# Patient Record
Sex: MALE | Race: WHITE | NOT HISPANIC OR LATINO | Employment: FULL TIME | URBAN - METROPOLITAN AREA
[De-identification: names, ages, dates, MRNs, and addresses within clinical notes are randomized per-mention and may not be internally consistent; named-entity substitution may affect disease eponyms.]

---

## 2023-03-17 DIAGNOSIS — J45.909 ASTHMA, UNSPECIFIED ASTHMA SEVERITY, UNSPECIFIED WHETHER COMPLICATED, UNSPECIFIED WHETHER PERSISTENT (HHS-HCC): ICD-10-CM

## 2023-03-17 RX ORDER — ALBUTEROL SULFATE 90 UG/1
2 AEROSOL, METERED RESPIRATORY (INHALATION) EVERY 4 HOURS PRN
COMMUNITY
Start: 2013-10-21 | End: 2023-03-17 | Stop reason: SDUPTHER

## 2023-03-17 RX ORDER — ALBUTEROL SULFATE 90 UG/1
2 AEROSOL, METERED RESPIRATORY (INHALATION) EVERY 4 HOURS PRN
Qty: 18 G | Refills: 3 | Status: SHIPPED | OUTPATIENT
Start: 2023-03-17 | End: 2023-11-10 | Stop reason: SDUPTHER

## 2023-07-31 PROBLEM — R23.2 FACIAL FLUSHING: Status: ACTIVE | Noted: 2023-07-31

## 2023-07-31 PROBLEM — H61.23 BILATERAL IMPACTED CERUMEN: Status: RESOLVED | Noted: 2023-07-31 | Resolved: 2023-07-31

## 2023-07-31 PROBLEM — K21.9 GERD (GASTROESOPHAGEAL REFLUX DISEASE): Status: ACTIVE | Noted: 2023-07-31

## 2023-07-31 PROBLEM — R11.2 NAUSEA AND VOMITING: Status: RESOLVED | Noted: 2023-07-31 | Resolved: 2023-07-31

## 2023-07-31 PROBLEM — F41.9 ANXIETY DISORDER: Status: ACTIVE | Noted: 2023-07-31

## 2023-07-31 PROBLEM — J30.9 ALLERGIC RHINITIS: Status: ACTIVE | Noted: 2023-07-31

## 2023-07-31 PROBLEM — J06.9 UPPER RESPIRATORY INFECTION: Status: RESOLVED | Noted: 2023-07-31 | Resolved: 2023-07-31

## 2023-07-31 PROBLEM — M75.82 TENDINITIS OF LEFT ROTATOR CUFF: Status: ACTIVE | Noted: 2023-07-31

## 2023-07-31 PROBLEM — A08.4 VIRAL GASTROENTERITIS: Status: RESOLVED | Noted: 2023-07-31 | Resolved: 2023-07-31

## 2023-07-31 PROBLEM — F43.20 ADJUSTMENT DISORDER: Status: ACTIVE | Noted: 2023-07-31

## 2023-07-31 PROBLEM — N45.2 ORCHITIS: Status: ACTIVE | Noted: 2023-07-31

## 2023-07-31 PROBLEM — R35.0 URINARY FREQUENCY: Status: RESOLVED | Noted: 2023-07-31 | Resolved: 2023-07-31

## 2023-07-31 PROBLEM — J45.909 ASTHMA (HHS-HCC): Status: ACTIVE | Noted: 2023-07-31

## 2023-08-01 ENCOUNTER — OFFICE VISIT (OUTPATIENT)
Dept: PRIMARY CARE | Facility: CLINIC | Age: 23
End: 2023-08-01
Payer: COMMERCIAL

## 2023-08-01 VITALS
HEART RATE: 86 BPM | DIASTOLIC BLOOD PRESSURE: 86 MMHG | BODY MASS INDEX: 36.07 KG/M2 | SYSTOLIC BLOOD PRESSURE: 132 MMHG | HEIGHT: 71 IN

## 2023-08-01 DIAGNOSIS — J01.00 ACUTE NON-RECURRENT MAXILLARY SINUSITIS: Primary | ICD-10-CM

## 2023-08-01 DIAGNOSIS — J30.9 ALLERGIC RHINITIS, UNSPECIFIED SEASONALITY, UNSPECIFIED TRIGGER: ICD-10-CM

## 2023-08-01 PROCEDURE — 1036F TOBACCO NON-USER: CPT | Performed by: FAMILY MEDICINE

## 2023-08-01 PROCEDURE — 99213 OFFICE O/P EST LOW 20 MIN: CPT | Performed by: FAMILY MEDICINE

## 2023-08-01 RX ORDER — FLUTICASONE PROPIONATE 50 MCG
2 SPRAY, SUSPENSION (ML) NASAL DAILY
Qty: 16 G | Refills: 1 | Status: SHIPPED | OUTPATIENT
Start: 2023-08-01 | End: 2023-12-15 | Stop reason: SDUPTHER

## 2023-08-01 RX ORDER — CETIRIZINE HYDROCHLORIDE 10 MG/1
10 TABLET ORAL DAILY
Qty: 30 TABLET | Refills: 2 | Status: SHIPPED | OUTPATIENT
Start: 2023-08-01 | End: 2023-12-15 | Stop reason: ALTCHOICE

## 2023-08-01 RX ORDER — LEVOFLOXACIN 500 MG/1
500 TABLET, FILM COATED ORAL DAILY
Qty: 7 TABLET | Refills: 0 | Status: SHIPPED | OUTPATIENT
Start: 2023-08-01 | End: 2023-08-08

## 2023-08-01 ASSESSMENT — PATIENT HEALTH QUESTIONNAIRE - PHQ9
1. LITTLE INTEREST OR PLEASURE IN DOING THINGS: NOT AT ALL
2. FEELING DOWN, DEPRESSED OR HOPELESS: NOT AT ALL
SUM OF ALL RESPONSES TO PHQ9 QUESTIONS 1 AND 2: 0

## 2023-08-04 ENCOUNTER — TELEPHONE (OUTPATIENT)
Dept: PRIMARY CARE | Facility: CLINIC | Age: 23
End: 2023-08-04
Payer: COMMERCIAL

## 2023-08-05 PROBLEM — J01.00 ACUTE NON-RECURRENT MAXILLARY SINUSITIS: Status: ACTIVE | Noted: 2023-08-05

## 2023-08-05 ASSESSMENT — ENCOUNTER SYMPTOMS: HYPERTENSION: 1

## 2023-08-05 NOTE — PROGRESS NOTES
Subjective   Patient ID: Markus Carballo is a 22 y.o. male who presents for Sinusitis, Hypertension, and Follow-up (Would like lead levels checked).  Sinusitis    Hypertension      Patient presents today with complaint of.  Otherwise feels well. No other complaints or concerns.  Acute sinusitis c/o headache fever facial pain cough ear pain cough green phlem. No ST .      The patient's relevant past medical, surgical, family and social history was reviewed in Epic.    Review of Systems  A complete review of systems was performed and all systems were normal except what is noted in the HPI.  Cough congestion postnasal drainage    Objective   Physical Exam  Constitutional:       Appearance: Normal appearance.   HENT:      Nose: Congestion present.      Mouth/Throat:      Pharynx: Posterior oropharyngeal erythema present.   Cardiovascular:      Rate and Rhythm: Normal rate and regular rhythm.   Pulmonary:      Effort: Pulmonary effort is normal.      Breath sounds: Normal breath sounds.   Musculoskeletal:         General: Normal range of motion.   Neurological:      General: No focal deficit present.      Mental Status: He is alert.             Assessment/Plan   Problem List Items Addressed This Visit       Allergic rhinitis    Acute non-recurrent maxillary sinusitis - Primary    Relevant Medications    levoFLOXacin (Levaquin) 500 mg tablet    fluticasone (Flonase Allergy Relief) 50 mcg/actuation nasal spray    cetirizine (ZyrTEC) 10 mg tablet        Patient understands and agrees with treatment plan.         Kedar Verma MD

## 2023-08-30 ENCOUNTER — CLINICAL SUPPORT (OUTPATIENT)
Dept: PRIMARY CARE | Facility: CLINIC | Age: 23
End: 2023-08-30
Payer: COMMERCIAL

## 2023-08-30 DIAGNOSIS — K21.00 GASTROESOPHAGEAL REFLUX DISEASE WITH ESOPHAGITIS, UNSPECIFIED WHETHER HEMORRHAGE: ICD-10-CM

## 2023-08-30 DIAGNOSIS — Z13.220 SCREENING FOR CHOLESTEROL LEVEL: ICD-10-CM

## 2023-08-30 DIAGNOSIS — Z13.29 SCREENING FOR THYROID DISORDER: ICD-10-CM

## 2023-08-30 LAB
ALANINE AMINOTRANSFERASE (SGPT) (U/L) IN SER/PLAS: 38 U/L (ref 10–52)
ALBUMIN (G/DL) IN SER/PLAS: 5.2 G/DL (ref 3.4–5)
ALKALINE PHOSPHATASE (U/L) IN SER/PLAS: 75 U/L (ref 33–120)
ANION GAP IN SER/PLAS: 15 MMOL/L (ref 10–20)
ASPARTATE AMINOTRANSFERASE (SGOT) (U/L) IN SER/PLAS: 24 U/L (ref 9–39)
BASOPHILS (10*3/UL) IN BLOOD BY AUTOMATED COUNT: 0.04 X10E9/L (ref 0–0.1)
BASOPHILS/100 LEUKOCYTES IN BLOOD BY AUTOMATED COUNT: 0.7 % (ref 0–2)
BILIRUBIN TOTAL (MG/DL) IN SER/PLAS: 0.6 MG/DL (ref 0–1.2)
CALCIUM (MG/DL) IN SER/PLAS: 10 MG/DL (ref 8.6–10.6)
CARBON DIOXIDE, TOTAL (MMOL/L) IN SER/PLAS: 28 MMOL/L (ref 21–32)
CHLORIDE (MMOL/L) IN SER/PLAS: 103 MMOL/L (ref 98–107)
CHOLESTEROL (MG/DL) IN SER/PLAS: 183 MG/DL (ref 0–199)
CHOLESTEROL IN HDL (MG/DL) IN SER/PLAS: 44.6 MG/DL
CHOLESTEROL/HDL RATIO: 4.1
CREATININE (MG/DL) IN SER/PLAS: 1.03 MG/DL (ref 0.5–1.3)
EOSINOPHILS (10*3/UL) IN BLOOD BY AUTOMATED COUNT: 0.12 X10E9/L (ref 0–0.7)
EOSINOPHILS/100 LEUKOCYTES IN BLOOD BY AUTOMATED COUNT: 2 % (ref 0–6)
ERYTHROCYTE DISTRIBUTION WIDTH (RATIO) BY AUTOMATED COUNT: 12.6 % (ref 11.5–14.5)
ERYTHROCYTE MEAN CORPUSCULAR HEMOGLOBIN CONCENTRATION (G/DL) BY AUTOMATED: 34.1 G/DL (ref 32–36)
ERYTHROCYTE MEAN CORPUSCULAR VOLUME (FL) BY AUTOMATED COUNT: 82 FL (ref 80–100)
ERYTHROCYTES (10*6/UL) IN BLOOD BY AUTOMATED COUNT: 5.47 X10E12/L (ref 4.5–5.9)
GFR MALE: >90 ML/MIN/1.73M2
GLUCOSE (MG/DL) IN SER/PLAS: 75 MG/DL (ref 74–99)
HEMATOCRIT (%) IN BLOOD BY AUTOMATED COUNT: 44.9 % (ref 41–52)
HEMOGLOBIN (G/DL) IN BLOOD: 15.3 G/DL (ref 13.5–17.5)
IMMATURE GRANULOCYTES/100 LEUKOCYTES IN BLOOD BY AUTOMATED COUNT: 0.3 % (ref 0–0.9)
LDL: 105 MG/DL (ref 0–119)
LEUKOCYTES (10*3/UL) IN BLOOD BY AUTOMATED COUNT: 6 X10E9/L (ref 4.4–11.3)
LYMPHOCYTES (10*3/UL) IN BLOOD BY AUTOMATED COUNT: 1.54 X10E9/L (ref 1.2–4.8)
LYMPHOCYTES/100 LEUKOCYTES IN BLOOD BY AUTOMATED COUNT: 25.6 % (ref 13–44)
MONOCYTES (10*3/UL) IN BLOOD BY AUTOMATED COUNT: 0.29 X10E9/L (ref 0.1–1)
MONOCYTES/100 LEUKOCYTES IN BLOOD BY AUTOMATED COUNT: 4.8 % (ref 2–10)
NEUTROPHILS (10*3/UL) IN BLOOD BY AUTOMATED COUNT: 4 X10E9/L (ref 1.2–7.7)
NEUTROPHILS/100 LEUKOCYTES IN BLOOD BY AUTOMATED COUNT: 66.6 % (ref 40–80)
NON HDL CHOLESTEROL: 138 MG/DL (ref 0–149)
NRBC (PER 100 WBCS) BY AUTOMATED COUNT: 0 /100 WBC (ref 0–0)
PLATELETS (10*3/UL) IN BLOOD AUTOMATED COUNT: 209 X10E9/L (ref 150–450)
POTASSIUM (MMOL/L) IN SER/PLAS: 3.8 MMOL/L (ref 3.5–5.3)
PROTEIN TOTAL: 7.2 G/DL (ref 6.4–8.2)
SODIUM (MMOL/L) IN SER/PLAS: 142 MMOL/L (ref 136–145)
THYROTROPIN (MIU/L) IN SER/PLAS BY DETECTION LIMIT <= 0.05 MIU/L: 1.27 MIU/L (ref 0.44–3.98)
TRIGLYCERIDE (MG/DL) IN SER/PLAS: 165 MG/DL (ref 0–149)
UREA NITROGEN (MG/DL) IN SER/PLAS: 12 MG/DL (ref 6–23)
VLDL: 33 MG/DL (ref 0–40)

## 2023-08-30 PROCEDURE — 80053 COMPREHEN METABOLIC PANEL: CPT

## 2023-08-30 PROCEDURE — 80061 LIPID PANEL: CPT

## 2023-08-30 PROCEDURE — 84443 ASSAY THYROID STIM HORMONE: CPT

## 2023-08-30 PROCEDURE — 85025 COMPLETE CBC W/AUTO DIFF WBC: CPT

## 2023-09-06 ENCOUNTER — TELEPHONE (OUTPATIENT)
Dept: PRIMARY CARE | Facility: CLINIC | Age: 23
End: 2023-09-06
Payer: COMMERCIAL

## 2023-11-10 DIAGNOSIS — J45.909 ASTHMA, UNSPECIFIED ASTHMA SEVERITY, UNSPECIFIED WHETHER COMPLICATED, UNSPECIFIED WHETHER PERSISTENT (HHS-HCC): ICD-10-CM

## 2023-11-10 RX ORDER — ALBUTEROL SULFATE 90 UG/1
2 AEROSOL, METERED RESPIRATORY (INHALATION) EVERY 4 HOURS PRN
Qty: 18 G | Refills: 1 | Status: SHIPPED | OUTPATIENT
Start: 2023-11-10 | End: 2024-01-11 | Stop reason: SDUPTHER

## 2023-12-15 ENCOUNTER — OFFICE VISIT (OUTPATIENT)
Dept: PRIMARY CARE | Facility: CLINIC | Age: 23
End: 2023-12-15
Payer: COMMERCIAL

## 2023-12-15 VITALS
HEIGHT: 72 IN | DIASTOLIC BLOOD PRESSURE: 88 MMHG | SYSTOLIC BLOOD PRESSURE: 144 MMHG | HEART RATE: 65 BPM | BODY MASS INDEX: 31.42 KG/M2 | WEIGHT: 232 LBS

## 2023-12-15 DIAGNOSIS — J01.00 ACUTE NON-RECURRENT MAXILLARY SINUSITIS: ICD-10-CM

## 2023-12-15 PROCEDURE — 1036F TOBACCO NON-USER: CPT | Performed by: FAMILY MEDICINE

## 2023-12-15 PROCEDURE — 99213 OFFICE O/P EST LOW 20 MIN: CPT | Performed by: FAMILY MEDICINE

## 2023-12-15 RX ORDER — FLUTICASONE PROPIONATE 50 MCG
2 SPRAY, SUSPENSION (ML) NASAL DAILY
Qty: 16 G | Refills: 1 | Status: SHIPPED | OUTPATIENT
Start: 2023-12-15 | End: 2024-12-14

## 2023-12-15 RX ORDER — AZITHROMYCIN 250 MG/1
TABLET, FILM COATED ORAL
Qty: 6 TABLET | Refills: 0 | Status: SHIPPED | OUTPATIENT
Start: 2023-12-15 | End: 2023-12-20

## 2023-12-15 ASSESSMENT — ENCOUNTER SYMPTOMS
DEPRESSION: 0
OCCASIONAL FEELINGS OF UNSTEADINESS: 0
LOSS OF SENSATION IN FEET: 0

## 2023-12-15 ASSESSMENT — PATIENT HEALTH QUESTIONNAIRE - PHQ9
2. FEELING DOWN, DEPRESSED OR HOPELESS: NOT AT ALL
1. LITTLE INTEREST OR PLEASURE IN DOING THINGS: NOT AT ALL
SUM OF ALL RESPONSES TO PHQ9 QUESTIONS 1 AND 2: 0

## 2023-12-15 NOTE — PROGRESS NOTES
Subjective   Patient ID: Markus Carballo is a 23 y.o. male who presents for Hoarseness (No voice) and Cough.      HPI  Acute sinusitis c/o headache fever facial pain cough ear pain cough green phlem. No ST .     Current Outpatient Medications on File Prior to Visit   Medication Sig Dispense Refill    albuterol 90 mcg/actuation inhaler Inhale 2 puffs every 4 hours if needed for wheezing or shortness of breath. 18 g 1    fluticasone (Flonase Allergy Relief) 50 mcg/actuation nasal spray Administer 2 sprays into each nostril once daily. Shake gently. Before first use, prime pump. After use, clean tip and replace cap. 16 g 1    [DISCONTINUED] cetirizine (ZyrTEC) 10 mg tablet Take 1 tablet (10 mg) by mouth once daily. 30 tablet 2     No current facility-administered medications on file prior to visit.        Review of Systems   Constitutional: Negative.    HENT:  Positive for congestion, ear pain, postnasal drip, sinus pressure, sore throat and trouble swallowing. Negative for hearing loss.    Eyes: Negative.    Respiratory: Negative.     Cardiovascular: Negative.    Gastrointestinal: Negative.    Genitourinary: Negative.    Musculoskeletal: Negative.    Neurological: Negative.    Hematological: Negative.    Psychiatric/Behavioral: Negative.         Objective   Blood Pressure 144/88   Pulse 65   Height 1.829 m (6')   Weight 105 kg (232 lb)   Body Mass Index 31.46 kg/m²   BSA: 2.31 meters squared  Growth percentiles: Facility age limit for growth %sagar is 20 years. Facility age limit for growth %sagar is 20 years.   No visits with results within 1 Week(s) from this visit.   Latest known visit with results is:   Clinical Support on 08/30/2023   Component Date Value Ref Range Status    Glucose 08/30/2023 75  74 - 99 mg/dL Final    Sodium 08/30/2023 142  136 - 145 mmol/L Final    Potassium 08/30/2023 3.8  3.5 - 5.3 mmol/L Final    Chloride 08/30/2023 103  98 - 107 mmol/L Final    Bicarbonate 08/30/2023 28  21 - 32 mmol/L Final     Anion Gap 08/30/2023 15  10 - 20 mmol/L Final    Urea Nitrogen 08/30/2023 12  6 - 23 mg/dL Final    Creatinine 08/30/2023 1.03  0.50 - 1.30 mg/dL Final    GFR MALE 08/30/2023 >90  >90 mL/min/1.73m2 Final     CALCULATIONS OF ESTIMATED GFR ARE PERFORMED   USING THE 2021 CKD-EPI STUDY REFIT EQUATION   WITHOUT THE RACE VARIABLE FOR THE IDMS-TRACEABLE   CREATININE METHODS.    https://jasn.asnjournals.org/content/early/2021/09/22/ASN.6988753828    Calcium 08/30/2023 10.0  8.6 - 10.6 mg/dL Final    Albumin 08/30/2023 5.2 (H)  3.4 - 5.0 g/dL Final    Alkaline Phosphatase 08/30/2023 75  33 - 120 U/L Final    Total Protein 08/30/2023 7.2  6.4 - 8.2 g/dL Final    AST 08/30/2023 24  9 - 39 U/L Final    Total Bilirubin 08/30/2023 0.6  0.0 - 1.2 mg/dL Final    ALT (SGPT) 08/30/2023 38  10 - 52 U/L Final     Patients treated with Sulfasalazine may generate    falsely decreased results for ALT.    WBC 08/30/2023 6.0  4.4 - 11.3 x10E9/L Final    nRBC 08/30/2023 0.0  0.0 - 0.0 /100 WBC Final    RBC 08/30/2023 5.47  4.50 - 5.90 x10E12/L Final    Hemoglobin 08/30/2023 15.3  13.5 - 17.5 g/dL Final    Hematocrit 08/30/2023 44.9  41.0 - 52.0 % Final    MCV 08/30/2023 82  80 - 100 fL Final    MCHC 08/30/2023 34.1  32.0 - 36.0 g/dL Final    Platelets 08/30/2023 209  150 - 450 x10E9/L Final    RDW 08/30/2023 12.6  11.5 - 14.5 % Final    Neutrophils % 08/30/2023 66.6  40.0 - 80.0 % Final    Immature Granulocytes %, Automated 08/30/2023 0.3  0.0 - 0.9 % Final     Immature Granulocyte Count (IG) includes promyelocytes,    myelocytes and metamyelocytes but does not include bands.   Percent differential counts (%) should be interpreted in the   context of the absolute cell counts (cells/L).    Lymphocytes % 08/30/2023 25.6  13.0 - 44.0 % Final    Monocytes % 08/30/2023 4.8  2.0 - 10.0 % Final    Eosinophils % 08/30/2023 2.0  0.0 - 6.0 % Final    Basophils % 08/30/2023 0.7  0.0 - 2.0 % Final    Neutrophils Absolute 08/30/2023 4.00  1.20 - 7.70  x10E9/L Final    Lymphocytes Absolute 08/30/2023 1.54  1.20 - 4.80 x10E9/L Final    Monocytes Absolute 08/30/2023 0.29  0.10 - 1.00 x10E9/L Final    Eosinophils Absolute 08/30/2023 0.12  0.00 - 0.70 x10E9/L Final    Basophils Absolute 08/30/2023 0.04  0.00 - 0.10 x10E9/L Final    TSH 08/30/2023 1.27  0.44 - 3.98 mIU/L Final     TSH testing is performed using different testing    methodology at [facility] than at other    [facility]. Direct result comparisons should    only be made within the same method.    Cholesterol 08/30/2023 183  0 - 199 mg/dL Final    .      AGE      DESIRABLE   BORDERLINE HIGH   HIGH     0-19 Y     0 - 169       170 - 199     >/= 200    20-24 Y     0 - 189       190 - 224     >/= 225         >24 Y     0 - 199       200 - 239     >/= 240   **All ranges are based on fasting samples. Specific   therapeutic targets will vary based on patient-specific   cardiac risk.  .   Pediatric guidelines reference:Pediatrics 2011, 128(S5).   Adult guidelines reference: NCEP ATPIII Guidelines,     NEENA 2001, 258:2486-97  .   Venipuncture immediately after or during the    administration of Metamizole may lead to falsely   low results. Testing should be performed immediately   prior to Metamizole dosing.    HDL 08/30/2023 44.6  mg/dL Final    .      AGE      VERY LOW   LOW     NORMAL    HIGH       0-19 Y       < 35   < 40     40-45     ----    20-24 Y       ----   < 40       >45     ----      >24 Y       ----   < 40     40-60      >60  .    Cholesterol/HDL Ratio 08/30/2023 4.1   Final    REF VALUES  DESIRABLE  < 3.4  HIGH RISK  > 5.0    LDL 08/30/2023 105  0 - 119 mg/dL Final    .                           NEAR      BORD      AGE      DESIRABLE  OPTIMAL    HIGH     HIGH     VERY HIGH     0-19 Y     0 - 109     ---    110-129   >/= 130     ----    20-24 Y     0 - 119     ---    120-159   >/= 160     ----      >24 Y     0 -  99   100-129  130-159   160-189     >/=190  .    VLDL 08/30/2023 33   0 - 40 mg/dL Final    Triglycerides 08/30/2023 165 (H)  0 - 149 mg/dL Final    .      AGE      DESIRABLE   BORDERLINE HIGH   HIGH     VERY HIGH   0 D-90 D    19 - 174         ----         ----        ----  91 D- 9 Y     0 -  74        75 -  99     >/= 100      ----    10-19 Y     0 -  89        90 - 129     >/= 130      ----    20-24 Y     0 - 114       115 - 149     >/= 150      ----         >24 Y     0 - 149       150 - 199    200- 499    >/= 500  .   Venipuncture immediately after or during the    administration of Metamizole may lead to falsely   low results. Testing should be performed immediately   prior to Metamizole dosing.    Non HDL Cholesterol 08/30/2023 138  0 - 149 mg/dL Final        AGE      DESIRABLE   BORDERLINE HIGH   HIGH     VERY HIGH     0-19 Y     0 - 119       120 - 144     >/= 145    >/= 160    20-24 Y     0 - 149       150 - 189     >/= 190      ----         >24 Y    30 MG/DL ABOVE LDL CHOLESTEROL GOAL  .      Physical Exam  Constitutional:       Appearance: Normal appearance.   HENT:      Mouth/Throat:      Pharynx: Posterior oropharyngeal erythema present.   Cardiovascular:      Rate and Rhythm: Normal rate and regular rhythm.   Pulmonary:      Effort: Pulmonary effort is normal.      Breath sounds: Normal breath sounds.   Abdominal:      General: Bowel sounds are normal.   Neurological:      General: No focal deficit present.      Mental Status: He is alert.   Psychiatric:         Mood and Affect: Mood normal.         Assessment/Plan   Problem List Items Addressed This Visit             ICD-10-CM    Acute non-recurrent maxillary sinusitis J01.00    Relevant Medications    fluticasone (Flonase Allergy Relief) 50 mcg/actuation nasal spray    azithromycin (Zithromax) 250 mg tablet

## 2023-12-16 ASSESSMENT — ENCOUNTER SYMPTOMS
CARDIOVASCULAR NEGATIVE: 1
TROUBLE SWALLOWING: 1
MUSCULOSKELETAL NEGATIVE: 1
EYES NEGATIVE: 1
SINUS PRESSURE: 1
SORE THROAT: 1
CONSTITUTIONAL NEGATIVE: 1
PSYCHIATRIC NEGATIVE: 1
GASTROINTESTINAL NEGATIVE: 1
RESPIRATORY NEGATIVE: 1
NEUROLOGICAL NEGATIVE: 1
HEMATOLOGIC/LYMPHATIC NEGATIVE: 1

## 2024-01-11 DIAGNOSIS — J45.909 ASTHMA, UNSPECIFIED ASTHMA SEVERITY, UNSPECIFIED WHETHER COMPLICATED, UNSPECIFIED WHETHER PERSISTENT (HHS-HCC): ICD-10-CM

## 2024-01-11 RX ORDER — ALBUTEROL SULFATE 90 UG/1
2 AEROSOL, METERED RESPIRATORY (INHALATION) EVERY 4 HOURS PRN
Qty: 18 G | Refills: 3 | Status: SHIPPED | OUTPATIENT
Start: 2024-01-11

## 2024-06-20 DIAGNOSIS — J45.909 ASTHMA, UNSPECIFIED ASTHMA SEVERITY, UNSPECIFIED WHETHER COMPLICATED, UNSPECIFIED WHETHER PERSISTENT (HHS-HCC): ICD-10-CM

## 2024-06-20 RX ORDER — ALBUTEROL SULFATE 90 UG/1
2 AEROSOL, METERED RESPIRATORY (INHALATION) EVERY 4 HOURS PRN
Qty: 18 G | Refills: 1 | Status: SHIPPED | OUTPATIENT
Start: 2024-06-20

## 2024-07-16 DIAGNOSIS — J45.909 ASTHMA, UNSPECIFIED ASTHMA SEVERITY, UNSPECIFIED WHETHER COMPLICATED, UNSPECIFIED WHETHER PERSISTENT (HHS-HCC): ICD-10-CM

## 2024-07-16 RX ORDER — ALBUTEROL SULFATE 90 UG/1
2 AEROSOL, METERED RESPIRATORY (INHALATION) EVERY 4 HOURS PRN
Qty: 6.7 G | Refills: 1 | Status: SHIPPED | OUTPATIENT
Start: 2024-07-16

## 2024-10-10 ENCOUNTER — OFFICE VISIT (OUTPATIENT)
Dept: URGENT CARE | Age: 24
End: 2024-10-10
Payer: COMMERCIAL

## 2024-10-10 VITALS
WEIGHT: 225 LBS | BODY MASS INDEX: 30.52 KG/M2 | SYSTOLIC BLOOD PRESSURE: 149 MMHG | HEART RATE: 70 BPM | RESPIRATION RATE: 18 BRPM | OXYGEN SATURATION: 98 % | TEMPERATURE: 98 F | DIASTOLIC BLOOD PRESSURE: 84 MMHG

## 2024-10-10 DIAGNOSIS — J06.9 VIRAL URI: ICD-10-CM

## 2024-10-10 DIAGNOSIS — R50.9 FEVER, UNSPECIFIED FEVER CAUSE: Primary | ICD-10-CM

## 2024-10-10 LAB
POC RAPID INFLUENZA A: NEGATIVE
POC RAPID INFLUENZA B: NEGATIVE

## 2024-10-10 PROCEDURE — 1036F TOBACCO NON-USER: CPT | Performed by: EMERGENCY MEDICINE

## 2024-10-10 PROCEDURE — 99203 OFFICE O/P NEW LOW 30 MIN: CPT | Performed by: EMERGENCY MEDICINE

## 2024-10-10 PROCEDURE — 87804 INFLUENZA ASSAY W/OPTIC: CPT | Performed by: EMERGENCY MEDICINE

## 2024-10-10 ASSESSMENT — PATIENT HEALTH QUESTIONNAIRE - PHQ9
SUM OF ALL RESPONSES TO PHQ9 QUESTIONS 1 AND 2: 0
1. LITTLE INTEREST OR PLEASURE IN DOING THINGS: NOT AT ALL
2. FEELING DOWN, DEPRESSED OR HOPELESS: NOT AT ALL

## 2024-10-10 ASSESSMENT — ENCOUNTER SYMPTOMS
COUGH: 0
CHEST TIGHTNESS: 0
SORE THROAT: 1
WHEEZING: 0
SHORTNESS OF BREATH: 1

## 2024-10-10 ASSESSMENT — PAIN SCALES - GENERAL: PAINLEVEL: 6

## 2024-10-10 NOTE — LETTER
October 10, 2024     Patient: Markus Carballo   YOB: 2000   Date of Visit: 10/10/2024       To Whom It May Concern:    Markus Carballo was seen in my clinic on 10/10/2024 at 10:30 am. Please excuse Markus for his absence from work on this day to make the appointment. He will return tomorrow 10/11/24    If you have any questions or concerns, please don't hesitate to call.         Sincerely,         [facility]        CC: No Recipients

## 2024-10-10 NOTE — PROGRESS NOTES
Subjective   Patient ID: Markus Carballo is a 23 y.o. male. They present today with a chief complaint of Shortness of Breath (Denies cough pt feels SOB and pain when he takes a deep breath- hurts in his back on both sides (upper ribs) took 2 covid tests which were negative  concerned for PNA).    History of Present Illness    Shortness of Breath  Associated symptoms: sore throat    Associated symptoms: no cough and no wheezing     this is a 23-year-old male who presents today complaining of some shortness of breath without any cough and sore throat for the past 2 days.  States his 2 home COVID tests were negative and his expressing concern about pneumonia.  He denies any fever or chills.  He denies any chest pain.  He complains of back pain.    Past Medical History  Allergies as of 10/10/2024 - Reviewed 10/10/2024   Allergen Reaction Noted    Ofloxacin Other 07/31/2023    Penicillins Unknown 07/31/2023       (Not in a hospital admission)       Past Medical History:   Diagnosis Date    Upper respiratory infection 07/31/2023    Urinary frequency 07/31/2023    Viral gastroenteritis 07/31/2023       History reviewed. No pertinent surgical history.     reports that he has never smoked. He has never used smokeless tobacco. He reports that he does not drink alcohol and does not use drugs.    Review of Systems  Review of Systems   HENT:  Positive for sore throat.    Respiratory:  Positive for shortness of breath. Negative for cough, chest tightness and wheezing.                                   Objective    Vitals:    10/10/24 1028   BP: 149/84   Pulse: 70   Resp: 18   Temp: 36.7 °C (98 °F)   SpO2: 98%   Weight: 102 kg (225 lb)     No LMP for male patient.    Physical Exam  Patient is awake alert oriented x 3 in no acute distress.  Vital signs are stable.  He is afebrile.  Nontoxic-appearing.  HEENT examination is unremarkable  Cardiovascular is regular rate and rhythm.  Lungs are clear throughout.  Procedures    Point of Care  Test & Imaging Results from this visit  No results found for this visit on 10/10/24.   No results found.    Diagnostic study results (if any) were reviewed by [facility].    Assessment/Plan   Allergies, medications, history, and pertinent labs/EKGs/Imaging reviewed by Jason Horton DO.     Medical Decision Making  Rule out flu    Orders and Diagnoses  There are no diagnoses linked to this encounter.    Medical Admin Record      Patient disposition: Home    Electronically signed by [facility]  10:31 AM

## 2024-10-11 ENCOUNTER — OFFICE VISIT (OUTPATIENT)
Dept: URGENT CARE | Age: 24
End: 2024-10-11
Payer: COMMERCIAL

## 2024-10-11 ENCOUNTER — HOSPITAL ENCOUNTER (OUTPATIENT)
Dept: RADIOLOGY | Facility: CLINIC | Age: 24
Discharge: HOME | End: 2024-10-11
Payer: COMMERCIAL

## 2024-10-11 VITALS
DIASTOLIC BLOOD PRESSURE: 82 MMHG | WEIGHT: 225 LBS | OXYGEN SATURATION: 98 % | HEART RATE: 94 BPM | BODY MASS INDEX: 30.52 KG/M2 | RESPIRATION RATE: 16 BRPM | SYSTOLIC BLOOD PRESSURE: 125 MMHG | TEMPERATURE: 100.1 F

## 2024-10-11 DIAGNOSIS — R05.1 ACUTE COUGH: Primary | ICD-10-CM

## 2024-10-11 DIAGNOSIS — R05.1 ACUTE COUGH: ICD-10-CM

## 2024-10-11 DIAGNOSIS — R52 PAIN: ICD-10-CM

## 2024-10-11 PROCEDURE — 71046 X-RAY EXAM CHEST 2 VIEWS: CPT

## 2024-10-11 RX ORDER — PREDNISONE 20 MG/1
20 TABLET ORAL 2 TIMES DAILY
Qty: 10 TABLET | Refills: 0 | Status: SHIPPED | OUTPATIENT
Start: 2024-10-11 | End: 2024-10-16

## 2024-10-11 RX ORDER — BENZONATATE 200 MG/1
200 CAPSULE ORAL 3 TIMES DAILY PRN
Qty: 42 CAPSULE | Refills: 0 | Status: SHIPPED | OUTPATIENT
Start: 2024-10-11 | End: 2025-04-09

## 2024-10-11 RX ORDER — ALBUTEROL SULFATE 90 UG/1
2 INHALANT RESPIRATORY (INHALATION) EVERY 4 HOURS PRN
Qty: 8.5 G | Refills: 0 | Status: SHIPPED | OUTPATIENT
Start: 2024-10-11 | End: 2025-10-11

## 2024-10-11 RX ORDER — DOXYCYCLINE 100 MG/1
100 CAPSULE ORAL 2 TIMES DAILY
Qty: 14 CAPSULE | Refills: 0 | Status: SHIPPED | OUTPATIENT
Start: 2024-10-11 | End: 2024-10-18

## 2024-10-11 RX ORDER — IBUPROFEN 600 MG/1
600 TABLET ORAL EVERY 6 HOURS PRN
Qty: 40 TABLET | Refills: 0 | Status: SHIPPED | OUTPATIENT
Start: 2024-10-11 | End: 2024-10-21

## 2024-10-11 ASSESSMENT — ENCOUNTER SYMPTOMS
SINUS PRESSURE: 0
FEVER: 1
CHEST TIGHTNESS: 1
COUGH: 1
HEADACHES: 1
SINUS PAIN: 0
CHILLS: 1
FATIGUE: 1
SORE THROAT: 1
SHORTNESS OF BREATH: 1
DIZZINESS: 1

## 2024-10-11 ASSESSMENT — PAIN SCALES - GENERAL: PAINLEVEL: 6

## 2024-10-11 NOTE — PROGRESS NOTES
Subjective   Patient ID: Markus Carballo is a 23 y.o. male. They present today with a chief complaint of URI (Cough, fever, body aches, pain with deep breath X 5 days. ).    History of Present Illness    URI  Presenting symptoms: congestion, cough, fatigue, fever and sore throat    Associated symptoms: headaches    Associated symptoms: no sinus pain        Patient reports cough, fever, body aches, and pain with deep breathing for 5 days.  Patient was seen at a different urgent care yesterday and states he was told he had a virus.  Rapid flu was negative.  Reports a history of asthma and states he does vape.    Past Medical History  Allergies as of 10/11/2024 - Reviewed 10/11/2024   Allergen Reaction Noted    Ofloxacin Other 07/31/2023    Penicillins Unknown 07/31/2023       (Not in a hospital admission)       Past Medical History:   Diagnosis Date    Upper respiratory infection 07/31/2023    Urinary frequency 07/31/2023    Viral gastroenteritis 07/31/2023       No past surgical history on file.     reports that he has never smoked. He has never used smokeless tobacco. He reports that he does not drink alcohol and does not use drugs.    Review of Systems  Review of Systems   Constitutional:  Positive for chills, fatigue and fever.   HENT:  Positive for congestion, postnasal drip and sore throat. Negative for sinus pressure and sinus pain.    Respiratory:  Positive for cough, chest tightness and shortness of breath.    Cardiovascular:  Negative for chest pain.   Neurological:  Positive for dizziness and headaches.                                  Objective    Vitals:    10/11/24 1821   BP: 125/82   Pulse: 94   Resp: 16   Temp: 37.8 °C (100.1 °F)   SpO2: 98%   Weight: 102 kg (225 lb)     No LMP for male patient.    Physical Exam  Constitutional:       Appearance: Normal appearance.   HENT:      Head: Normocephalic.      Right Ear: Tympanic membrane, ear canal and external ear normal.      Left Ear: Tympanic membrane, ear  canal and external ear normal.      Mouth/Throat:      Mouth: Mucous membranes are moist.      Pharynx: Posterior oropharyngeal erythema present.   Eyes:      Conjunctiva/sclera: Conjunctivae normal.   Cardiovascular:      Rate and Rhythm: Normal rate and regular rhythm.      Heart sounds: Normal heart sounds.   Pulmonary:      Effort: Pulmonary effort is normal.      Breath sounds: Examination of the right-lower field reveals decreased breath sounds. Decreased breath sounds present.      Comments: All other lobes clear.  Musculoskeletal:      Cervical back: Neck supple.   Neurological:      Mental Status: He is alert.         Procedures    Point of Care Test & Imaging Results from this visit  No results found for this visit on 10/11/24.   No results found.    Diagnostic study results (if any) were reviewed by ANGEL Jeffrey.    Assessment/Plan   Allergies, medications, history, and pertinent labs/EKGs/Imaging reviewed by ANGEL Jeffrey.     Medical Decision Making  Patient is for right upper lobe pneumonia.  Will start patient on doxycycline.  Will also place patient on prednisone, albuterol inhaler, and Tessalon Perles.  Patient was told to follow-up with his PCP in the next 3 to 5 days if symptoms are not improving.  If you develop severe chest pain or difficulty breathing and go straight to the emergency room.  Patient was told that final radiology reads are still pending and we will notify with results if needed.    Orders and Diagnoses  Diagnoses and all orders for this visit:  Acute cough  -     XR chest 2 views; Future      Medical Admin Record      Patient disposition: Home    Electronically signed by ANGEL Jeffrey  6:35 PM

## 2024-10-11 NOTE — LETTER
October 11, 2024     Patient: Markus Carballo   YOB: 2000   Date of Visit: 10/11/2024       To Whom It May Concern:    It is my medical opinion that Markus Carballo may return to work on 10/14/24 .    If you have any questions or concerns, please don't hesitate to call.         Sincerely,        Sabrina Noriega, APRN-CNP    CC: No Recipients

## 2024-12-17 ENCOUNTER — APPOINTMENT (OUTPATIENT)
Dept: PRIMARY CARE | Facility: CLINIC | Age: 24
End: 2024-12-17
Payer: COMMERCIAL

## 2024-12-26 ENCOUNTER — APPOINTMENT (OUTPATIENT)
Dept: PRIMARY CARE | Facility: CLINIC | Age: 24
End: 2024-12-26
Payer: COMMERCIAL

## 2025-05-12 DIAGNOSIS — J45.909 ASTHMA, UNSPECIFIED ASTHMA SEVERITY, UNSPECIFIED WHETHER COMPLICATED, UNSPECIFIED WHETHER PERSISTENT (HHS-HCC): ICD-10-CM

## 2025-05-12 RX ORDER — ALBUTEROL SULFATE 90 UG/1
2 INHALANT RESPIRATORY (INHALATION) EVERY 4 HOURS PRN
Qty: 6.7 G | Refills: 1 | OUTPATIENT
Start: 2025-05-12

## 2025-06-12 ENCOUNTER — APPOINTMENT (OUTPATIENT)
Dept: PRIMARY CARE | Facility: CLINIC | Age: 25
End: 2025-06-12
Payer: COMMERCIAL

## 2025-06-12 DIAGNOSIS — Z13.1 SCREENING FOR DIABETES MELLITUS: ICD-10-CM

## 2025-06-12 DIAGNOSIS — R05.1 ACUTE COUGH: ICD-10-CM

## 2025-06-12 DIAGNOSIS — E53.8 B12 DEFICIENCY: ICD-10-CM

## 2025-06-12 DIAGNOSIS — Z00.00 ROUTINE GENERAL MEDICAL EXAMINATION AT A HEALTH CARE FACILITY: Primary | ICD-10-CM

## 2025-06-12 DIAGNOSIS — E55.9 VITAMIN D DEFICIENCY: ICD-10-CM

## 2025-06-12 DIAGNOSIS — Z13.220 SCREENING FOR CHOLESTEROL LEVEL: ICD-10-CM

## 2025-06-12 DIAGNOSIS — J45.20 MILD INTERMITTENT ASTHMA, UNSPECIFIED WHETHER COMPLICATED (HHS-HCC): ICD-10-CM

## 2025-06-12 DIAGNOSIS — J30.9 ALLERGIC RHINITIS, UNSPECIFIED SEASONALITY, UNSPECIFIED TRIGGER: ICD-10-CM

## 2025-06-12 DIAGNOSIS — K21.9 GASTROESOPHAGEAL REFLUX DISEASE WITHOUT ESOPHAGITIS: ICD-10-CM

## 2025-06-12 DIAGNOSIS — Z13.29 SCREENING FOR THYROID DISORDER: ICD-10-CM

## 2025-06-12 DIAGNOSIS — J01.00 ACUTE NON-RECURRENT MAXILLARY SINUSITIS: ICD-10-CM

## 2025-06-12 PROCEDURE — 99395 PREV VISIT EST AGE 18-39: CPT | Performed by: FAMILY MEDICINE

## 2025-06-12 PROCEDURE — 3008F BODY MASS INDEX DOCD: CPT | Performed by: FAMILY MEDICINE

## 2025-06-12 RX ORDER — LORATADINE 10 MG/1
10 TABLET ORAL DAILY
Qty: 30 TABLET | Refills: 2 | Status: SHIPPED | OUTPATIENT
Start: 2025-06-12 | End: 2025-09-10

## 2025-06-12 RX ORDER — ALBUTEROL SULFATE 90 UG/1
2 INHALANT RESPIRATORY (INHALATION) EVERY 4 HOURS PRN
Qty: 8.5 G | Refills: 1 | Status: SHIPPED | OUTPATIENT
Start: 2025-06-12 | End: 2026-06-12

## 2025-06-12 RX ORDER — OMEPRAZOLE 40 MG/1
40 CAPSULE, DELAYED RELEASE ORAL
Qty: 90 CAPSULE | Refills: 1 | Status: SHIPPED | OUTPATIENT
Start: 2025-06-12 | End: 2025-12-09

## 2025-06-12 RX ORDER — FLUTICASONE PROPIONATE 50 MCG
2 SPRAY, SUSPENSION (ML) NASAL DAILY
Qty: 16 G | Refills: 1 | Status: SHIPPED | OUTPATIENT
Start: 2025-06-12 | End: 2026-06-12

## 2025-06-12 ASSESSMENT — ENCOUNTER SYMPTOMS
OCCASIONAL FEELINGS OF UNSTEADINESS: 0
DEPRESSION: 0
LOSS OF SENSATION IN FEET: 0

## 2025-06-15 VITALS
HEART RATE: 76 BPM | HEIGHT: 72 IN | BODY MASS INDEX: 31.83 KG/M2 | DIASTOLIC BLOOD PRESSURE: 88 MMHG | SYSTOLIC BLOOD PRESSURE: 134 MMHG | WEIGHT: 235 LBS

## 2025-06-15 PROBLEM — Z13.1 SCREENING FOR DIABETES MELLITUS: Status: ACTIVE | Noted: 2025-06-15

## 2025-06-15 PROBLEM — Z13.29 SCREENING FOR THYROID DISORDER: Status: ACTIVE | Noted: 2025-06-15

## 2025-06-15 PROBLEM — Z13.220 SCREENING FOR CHOLESTEROL LEVEL: Status: ACTIVE | Noted: 2025-06-15

## 2025-06-15 PROBLEM — R05.1 ACUTE COUGH: Status: ACTIVE | Noted: 2025-06-15

## 2025-06-15 PROBLEM — E55.9 VITAMIN D DEFICIENCY: Status: ACTIVE | Noted: 2025-06-15

## 2025-06-15 PROBLEM — Z00.00 ROUTINE GENERAL MEDICAL EXAMINATION AT A HEALTH CARE FACILITY: Status: ACTIVE | Noted: 2025-06-15

## 2025-06-15 PROBLEM — E53.8 B12 DEFICIENCY: Status: ACTIVE | Noted: 2025-06-15

## 2025-06-15 LAB
25(OH)D3+25(OH)D2 SERPL-MCNC: 34 NG/ML (ref 30–100)
ALBUMIN SERPL-MCNC: 4.7 G/DL (ref 3.6–5.1)
ALP SERPL-CCNC: 64 U/L (ref 36–130)
ALT SERPL-CCNC: 47 U/L (ref 9–46)
ANION GAP SERPL CALCULATED.4IONS-SCNC: 7 MMOL/L (CALC) (ref 7–17)
AST SERPL-CCNC: 27 U/L (ref 10–40)
BILIRUB SERPL-MCNC: 0.6 MG/DL (ref 0.2–1.2)
BUN SERPL-MCNC: 14 MG/DL (ref 7–25)
CALCIUM SERPL-MCNC: 9.4 MG/DL (ref 8.6–10.3)
CHLORIDE SERPL-SCNC: 105 MMOL/L (ref 98–110)
CHOLEST SERPL-MCNC: 156 MG/DL
CHOLEST/HDLC SERPL: 4.2 (CALC)
CO2 SERPL-SCNC: 28 MMOL/L (ref 20–32)
CREAT SERPL-MCNC: 1.03 MG/DL (ref 0.6–1.24)
EGFRCR SERPLBLD CKD-EPI 2021: 104 ML/MIN/1.73M2
ERYTHROCYTE [DISTWIDTH] IN BLOOD BY AUTOMATED COUNT: 13.3 % (ref 11–15)
EST. AVERAGE GLUCOSE BLD GHB EST-MCNC: 100 MG/DL
EST. AVERAGE GLUCOSE BLD GHB EST-SCNC: 5.5 MMOL/L
GLUCOSE SERPL-MCNC: 84 MG/DL (ref 65–139)
HBA1C MFR BLD: 5.1 %
HCT VFR BLD AUTO: 42.4 % (ref 38.5–50)
HDLC SERPL-MCNC: 37 MG/DL
HGB BLD-MCNC: 14.2 G/DL (ref 13.2–17.1)
LDLC SERPL CALC-MCNC: 99 MG/DL (CALC)
MCH RBC QN AUTO: 28.6 PG (ref 27–33)
MCHC RBC AUTO-ENTMCNC: 33.5 G/DL (ref 32–36)
MCV RBC AUTO: 85.5 FL (ref 80–100)
NONHDLC SERPL-MCNC: 119 MG/DL (CALC)
PLATELET # BLD AUTO: 197 THOUSAND/UL (ref 140–400)
PMV BLD REES-ECKER: 10.2 FL (ref 7.5–12.5)
POTASSIUM SERPL-SCNC: 4.3 MMOL/L (ref 3.5–5.3)
PROT SERPL-MCNC: 6.6 G/DL (ref 6.1–8.1)
RBC # BLD AUTO: 4.96 MILLION/UL (ref 4.2–5.8)
SODIUM SERPL-SCNC: 140 MMOL/L (ref 135–146)
TRIGL SERPL-MCNC: 106 MG/DL
TSH SERPL-ACNC: 1.34 MIU/L (ref 0.4–4.5)
VIT B12 SERPL-MCNC: 422 PG/ML (ref 200–1100)
WBC # BLD AUTO: 5.6 THOUSAND/UL (ref 3.8–10.8)

## 2025-06-15 NOTE — PROGRESS NOTES
Subjective   Patient ID: Markus Carballo is a 24 y.o. male who presents for Follow-up (Requesting labwork orders due to fatigue, nasal spray. Patient has had bad acid reflux and using OTC with no relief).    Last Physical : _2___ Years ago     Pt's PMH, PSH, SH, FH , meds and allergies was obtained / reviewed and updated .     Dental  Visits : Y  Vision issues : N  Hearing issues : N    Immunizations : Y    Diet :  Could be better   Exercise:    Weight concerns :    Alcohol: as noted in   Tobacco: as noted in   Recreational drugs :  None /as noted in      Metabolic screening   - Lipids   - Glucose   Has history of asthma blood pressure is high today has had worsening acid reflux which is made asthma worse needs refill for inhaler  Has been working a lot is tired  ==================================    Visit Vitals  /88 (BP Location: Right arm, Patient Position: Sitting, BP Cuff Size: Adult)   Pulse 76   Ht 1.829 m (6')   Wt 107 kg (235 lb)   BMI 31.87 kg/m²   Smoking Status Never   BSA 2.33 m²      =====================  Review of Systems:    Constitutional: no chills, no fever and no night sweats.     Eyes: no blurred vision and no eyesight problems.     ENT: no hearing loss, no nasal congestion, no nasal discharge, no hoarseness and no sore throat.     Cardiovascular: no chest pain, no intermittent leg claudication, no lower extremity edema, no palpitations and no syncope.     Respiratory: no cough, no shortness of breath     Gastrointestinal: no abdominal pain,  no nausea, no rectal pain and no vomiting.     Genitourinary: no dysuria, no change in urinary frequency,.     Musculoskeletal: no arthralgias, and no myalgias.     Integumentary: no new skin lesions and no rashes.     Neurological: no difficulty walking, no headache,     Psychiatric: no anxiety, no depression, no anhedonia and no substance use disorders.            All other systems have been reviewed and are negative for complaint.     =====================================================    Physical exam :    Constitutional: Alert and in no acute distress. Well developed, well nourished.     Eyes: Normal external exam. Pupils were equal in size, round, reactive to light (PERRL) with normal accommodation and extraocular movements intact (EOMI).     Ears, Nose, Mouth, and Throat: External inspection of ears and nose: Normal.  Otoscopic examination: Normal.      Neck: No neck mass was observed. Supple.     Cardiovascular: Heart rate and rhythm were normal, normal S1 and S2, no gallops, no murmurs and no pericardial rub    Pulmonary: No respiratory distress. Clear bilateral breath sounds.     Abdomen: Soft nontender; no abdominal mass palpated. No organomegaly.     Musculoskeletal: No joint swelling seen, normal movements of all extremities. Range of motion: Normal.  Muscle strength/tone: Normal.      Skin: Normal skin color and pigmentation, normal skin turgor, and no rash.     Neurologic: Deep tendon reflexes were 2+ and symmetric. Sensation: Normal.     Psychiatric: Judgment and insight: Intact. Mood and affect: Normal.    Lymphatic : Cervical/ axillary/ groin Lns Palpable/ non palpable       Assessment/Plan    Problem List Items Addressed This Visit       Allergic rhinitis    Relevant Medications    loratadine (Claritin) 10 mg tablet    omeprazole (PriLOSEC) 40 mg DR capsule    Other Relevant Orders    Lipid Panel (Completed)    CBC (Completed)    TSH with reflex to Free T4 if abnormal (Completed)    Hemoglobin A1C (Completed)    Comprehensive Metabolic Panel (Completed)    Vitamin B12 (Completed)    Vitamin D 25-Hydroxy,Total (for eval of Vitamin D levels) (Completed)    Asthma    Relevant Medications    loratadine (Claritin) 10 mg tablet    omeprazole (PriLOSEC) 40 mg DR capsule    Other Relevant Orders    Lipid Panel (Completed)    CBC (Completed)    TSH with reflex to Free T4 if abnormal (Completed)    Hemoglobin A1C (Completed)     Comprehensive Metabolic Panel (Completed)    Vitamin B12 (Completed)    Vitamin D 25-Hydroxy,Total (for eval of Vitamin D levels) (Completed)    GERD (gastroesophageal reflux disease)    Relevant Medications    loratadine (Claritin) 10 mg tablet    omeprazole (PriLOSEC) 40 mg DR capsule    Other Relevant Orders    Lipid Panel (Completed)    CBC (Completed)    TSH with reflex to Free T4 if abnormal (Completed)    Hemoglobin A1C (Completed)    Comprehensive Metabolic Panel (Completed)    Vitamin B12 (Completed)    Vitamin D 25-Hydroxy,Total (for eval of Vitamin D levels) (Completed)    Acute non-recurrent maxillary sinusitis    Relevant Medications    fluticasone (Flonase Allergy Relief) 50 mcg/actuation nasal spray    loratadine (Claritin) 10 mg tablet    omeprazole (PriLOSEC) 40 mg DR capsule    Other Relevant Orders    Lipid Panel (Completed)    CBC (Completed)    TSH with reflex to Free T4 if abnormal (Completed)    Hemoglobin A1C (Completed)    Comprehensive Metabolic Panel (Completed)    Vitamin B12 (Completed)    Vitamin D 25-Hydroxy,Total (for eval of Vitamin D levels) (Completed)    Acute cough    Relevant Medications    albuterol (ProAir HFA) 90 mcg/actuation inhaler    loratadine (Claritin) 10 mg tablet    omeprazole (PriLOSEC) 40 mg DR capsule    Other Relevant Orders    Lipid Panel (Completed)    CBC (Completed)    TSH with reflex to Free T4 if abnormal (Completed)    Hemoglobin A1C (Completed)    Comprehensive Metabolic Panel (Completed)    Vitamin B12 (Completed)    Vitamin D 25-Hydroxy,Total (for eval of Vitamin D levels) (Completed)    Screening for thyroid disorder    Relevant Medications    loratadine (Claritin) 10 mg tablet    omeprazole (PriLOSEC) 40 mg DR capsule    Other Relevant Orders    Lipid Panel (Completed)    CBC (Completed)    TSH with reflex to Free T4 if abnormal (Completed)    Hemoglobin A1C (Completed)    Comprehensive Metabolic Panel (Completed)    Vitamin B12 (Completed)    Vitamin  D 25-Hydroxy,Total (for eval of Vitamin D levels) (Completed)    Screening for cholesterol level    Relevant Medications    loratadine (Claritin) 10 mg tablet    omeprazole (PriLOSEC) 40 mg DR capsule    Other Relevant Orders    Lipid Panel (Completed)    CBC (Completed)    TSH with reflex to Free T4 if abnormal (Completed)    Hemoglobin A1C (Completed)    Comprehensive Metabolic Panel (Completed)    Vitamin B12 (Completed)    Vitamin D 25-Hydroxy,Total (for eval of Vitamin D levels) (Completed)    Routine general medical examination at a health care facility - Primary    Relevant Medications    loratadine (Claritin) 10 mg tablet    omeprazole (PriLOSEC) 40 mg DR capsule    Other Relevant Orders    Lipid Panel (Completed)    CBC (Completed)    TSH with reflex to Free T4 if abnormal (Completed)    Hemoglobin A1C (Completed)    Comprehensive Metabolic Panel (Completed)    Vitamin B12 (Completed)    Vitamin D 25-Hydroxy,Total (for eval of Vitamin D levels) (Completed)    Screening for diabetes mellitus    Relevant Medications    loratadine (Claritin) 10 mg tablet    omeprazole (PriLOSEC) 40 mg DR capsule    Other Relevant Orders    Lipid Panel (Completed)    CBC (Completed)    TSH with reflex to Free T4 if abnormal (Completed)    Hemoglobin A1C (Completed)    Comprehensive Metabolic Panel (Completed)    Vitamin B12 (Completed)    Vitamin D 25-Hydroxy,Total (for eval of Vitamin D levels) (Completed)    Vitamin D deficiency    Relevant Medications    loratadine (Claritin) 10 mg tablet    omeprazole (PriLOSEC) 40 mg DR capsule    Other Relevant Orders    Lipid Panel (Completed)    CBC (Completed)    TSH with reflex to Free T4 if abnormal (Completed)    Hemoglobin A1C (Completed)    Comprehensive Metabolic Panel (Completed)    Vitamin B12 (Completed)    Vitamin D 25-Hydroxy,Total (for eval of Vitamin D levels) (Completed)    B12 deficiency    Relevant Medications    loratadine (Claritin) 10 mg tablet    omeprazole  (PriLOSEC) 40 mg DR capsule    Other Relevant Orders    Lipid Panel (Completed)    CBC (Completed)    TSH with reflex to Free T4 if abnormal (Completed)    Hemoglobin A1C (Completed)    Comprehensive Metabolic Panel (Completed)    Vitamin B12 (Completed)    Vitamin D 25-Hydroxy,Total (for eval of Vitamin D levels) (Completed)